# Patient Record
Sex: FEMALE | Race: WHITE | NOT HISPANIC OR LATINO
[De-identification: names, ages, dates, MRNs, and addresses within clinical notes are randomized per-mention and may not be internally consistent; named-entity substitution may affect disease eponyms.]

---

## 2022-06-08 ENCOUNTER — APPOINTMENT (OUTPATIENT)
Dept: ORTHOPEDIC SURGERY | Facility: CLINIC | Age: 87
End: 2022-06-08
Payer: MEDICARE

## 2022-06-08 VITALS — WEIGHT: 160 LBS | HEIGHT: 60 IN | BODY MASS INDEX: 31.41 KG/M2

## 2022-06-08 PROBLEM — Z00.00 ENCOUNTER FOR PREVENTIVE HEALTH EXAMINATION: Status: ACTIVE | Noted: 2022-06-08

## 2022-06-08 PROCEDURE — J3490M: CUSTOM

## 2022-06-08 PROCEDURE — 73562 X-RAY EXAM OF KNEE 3: CPT | Mod: 50

## 2022-06-08 PROCEDURE — 20611 DRAIN/INJ JOINT/BURSA W/US: CPT | Mod: 50

## 2022-06-08 PROCEDURE — 99204 OFFICE O/P NEW MOD 45 MIN: CPT | Mod: 25

## 2022-06-08 NOTE — HISTORY OF PRESENT ILLNESS
[10] : 10 [0] : 0 [Tightness] : tightness [Standing] : standing [Walking] : walking [de-identified] : 6/8/22:  bilateral knee pain. [] : no [FreeTextEntry1] : shanta knees  [FreeTextEntry5] : pt states no injury has occurred

## 2022-06-08 NOTE — PHYSICAL EXAM
[NL (0)] : extension 0 degrees [4___] : quadriceps 4[unfilled]/5 [5___] : hamstring 5[unfilled]/5 [] : antalgic [Left] : left knee [Right] : right knee [Degenerative change] : Degenerative change [TWNoteComboBox7] : flexion 100 degrees

## 2022-06-08 NOTE — PROCEDURE
[Large Joint Injection] : Large joint injection [Bilateral] : bilaterally of the [Knee] : knee [Pain] : pain [Inflammation] : inflammation [Alcohol] : alcohol [Betadine] : betadine [___ cc    3mg] :  Betamethasone (Celestone) ~Vcc of 3mg [___ cc    1%] : Lidocaine ~Vcc of 1%  [___ cc    0.25%] : Bupivacaine (Marcaine) ~Vcc of 0.25%  [] : Patient tolerated procedure well [Call if redness, pain or fever occur] : call if redness, pain or fever occur [Apply ice for 15min out of every hour for the next 12-24 hours as tolerated] : apply ice for 15 minutes out of every hour for the next 12-24 hours as tolerated [Patient was advised to rest the joint(s) for ____ days] : patient was advised to rest the joint(s) for [unfilled] days [Previous OTC use and PT nontherapeutic] : patient has tried OTC's including aspirin, Ibuprofen, Aleve, etc or prescription NSAIDS, and/or exercises at home and/or physical therapy without satisfactory response [Patient had decreased mobility in the joint] : patient had decreased mobility in the joint [Risks, benefits, alternatives discussed / Verbal consent obtained] : the risks benefits, and alternatives have been discussed, and verbal consent was obtained [Altered anatomic landmarks d/t erosive arthritis] : altered anatomic landmarks d/t erosive arthritis [All ultrasound images have been permanently captured and stored accordingly in our picture archiving and communication system] : All ultrasound images have been permanently captured and stored accordingly in our picture archiving and communication system [Visualization of the needle and placement of injection was performed without complication] : visualization of the needle and placement of injection was performed without complication

## 2022-08-24 ENCOUNTER — APPOINTMENT (OUTPATIENT)
Dept: ORTHOPEDIC SURGERY | Facility: CLINIC | Age: 87
End: 2022-08-24

## 2022-08-24 PROCEDURE — J3490M: CUSTOM

## 2022-08-24 PROCEDURE — 20611 DRAIN/INJ JOINT/BURSA W/US: CPT | Mod: LT

## 2022-08-24 PROCEDURE — 99214 OFFICE O/P EST MOD 30 MIN: CPT | Mod: 25

## 2022-08-24 NOTE — PROCEDURE
[Large Joint Injection] : Large joint injection [Left] : of the left [Knee] : knee [Pain] : pain [Alcohol] : alcohol [Betadine] : betadine [___ cc    3mg] :  Betamethasone (Celestone) ~Vcc of 3mg [___ cc    1%] : Lidocaine ~Vcc of 1%  [___ cc    0.25%] : Bupivacaine (Marcaine) ~Vcc of 0.25%  [] : Patient tolerated procedure well [Call if redness, pain or fever occur] : call if redness, pain or fever occur [Apply ice for 15min out of every hour for the next 12-24 hours as tolerated] : apply ice for 15 minutes out of every hour for the next 12-24 hours as tolerated [Patient was advised to rest the joint(s) for ____ days] : patient was advised to rest the joint(s) for [unfilled] days [Previous OTC use and PT nontherapeutic] : patient has tried OTC's including aspirin, Ibuprofen, Aleve, etc or prescription NSAIDS, and/or exercises at home and/or physical therapy without satisfactory response [Patient had decreased mobility in the joint] : patient had decreased mobility in the joint [Risks, benefits, alternatives discussed / Verbal consent obtained] : the risks benefits, and alternatives have been discussed, and verbal consent was obtained [Prior failure or difficult injection] : prior failure or difficult injection [All ultrasound images have been permanently captured and stored accordingly in our picture archiving and communication system] : All ultrasound images have been permanently captured and stored accordingly in our picture archiving and communication system [Visualization of the needle and placement of injection was performed without complication] : visualization of the needle and placement of injection was performed without complication [Inflammation] : inflammation

## 2022-08-24 NOTE — HISTORY OF PRESENT ILLNESS
[10] : 10 [de-identified] : 6/8/22:  bilateral knee pain.\par 8/24/22: some recurrent pain.  left worse than right. [FreeTextEntry1] : bilateral knees  [de-identified] : cortisone injection

## 2022-08-24 NOTE — ASSESSMENT
[FreeTextEntry1] : severe bilatera knee oa. mild relief with injection.  some recurrent bilateral knee pain.  left worse than right.\par \par going on vacation next week.  will try cortisone today.  discussed risks of a little early.  \par \par

## 2022-12-14 ENCOUNTER — APPOINTMENT (OUTPATIENT)
Dept: ORTHOPEDIC SURGERY | Facility: CLINIC | Age: 87
End: 2022-12-14

## 2022-12-14 VITALS — HEIGHT: 60 IN | BODY MASS INDEX: 31.41 KG/M2 | WEIGHT: 160 LBS

## 2022-12-14 PROCEDURE — 20611 DRAIN/INJ JOINT/BURSA W/US: CPT | Mod: 50

## 2022-12-14 PROCEDURE — 99214 OFFICE O/P EST MOD 30 MIN: CPT | Mod: 25

## 2022-12-14 PROCEDURE — J3490M: CUSTOM

## 2022-12-14 NOTE — PROCEDURE
[FreeTextEntry3] : Procedure Name: Large Joint Injection / Aspiration: Celestone, Lidocaine and Marcaine with Ultrasound Evaluation and Guidance\par \par Large Joint Injection was performed because of pain and inflammation. Anesthesia: ethyl chloride sprayed topically.\par Celestone: An injection of Celestone 6 mg , 1 cc.\par Lidocaine 1%: 3 cc.\par Marcaine 0.25%:  3 cc.\par \par Patient has tried OTC's including aspirin, Ibuprofen, Aleve etc or prescription NSAIDS, and/or exercises at home and/ or physical therapy without satisfactory response and Patient has decreased mobility in the joint. The risks, benefits, and alternatives to cortisone injection were explained in full to the patient. Risks outlined include but are not limited to infection, sepsis, bleeding, scarring, skin discoloration, temporary increase in pain, syncopal episode, failure to resolve symptoms, allergic reaction, symptom recurrence, and elevation of blood sugar in diabetics. Patient understood the risks. All questions were answered.   Oral informed consent was obtained.   Sterile technique was utilized for the procedure including the preparation of the solutions used for the injection. Medication was injected into BILATERAL KNEES.   Patient tolerated the procedure well. Advised to ice the injection site this evening.  Post Procedure Instructions: Patient was advised to call if redness, pain, or fever occur and apply ice for 15 min. out of every hour for the next 12-24 hours as tolerated. patient was advised to rest the joint(s) for 2 days. \par \par Ultrasound Extremity (17138) was used because of the following reasons: inflammation.\par Ultrasound Guidance was used for the following reasons: for accurate placement of needle into knee joints.\par Diagnostic ultrasound was performed of the knee and is positive for synovitis.\par Ultrasound guided injection was performed of the knee, visualization of the needle and placement of injection was performed without complication.\par \par \par

## 2022-12-14 NOTE — DISCUSSION/SUMMARY
[de-identified] : Progress note completed by Kaitlin Montesinos PA-C\par *Dr. Marroquin - The [facility] assigned on this date is under my supervision and saw this patient independently.  I have reviewed the note and agree with the treatment provided.

## 2022-12-14 NOTE — HISTORY OF PRESENT ILLNESS
[10] : 10 [5] : 5 [de-identified] : 6/8/22:  bilateral knee pain.\par 8/24/22: some recurrent pain.  left worse than right.\par 12/14/22:  bilateral knee pain.   [FreeTextEntry1] : bilateral knees

## 2023-04-05 ENCOUNTER — APPOINTMENT (OUTPATIENT)
Dept: ORTHOPEDIC SURGERY | Facility: CLINIC | Age: 88
End: 2023-04-05
Payer: MEDICARE

## 2023-04-05 VITALS — BODY MASS INDEX: 31.41 KG/M2 | HEIGHT: 60 IN | WEIGHT: 160 LBS

## 2023-04-05 PROCEDURE — 99214 OFFICE O/P EST MOD 30 MIN: CPT | Mod: 25

## 2023-04-05 PROCEDURE — 20611 DRAIN/INJ JOINT/BURSA W/US: CPT | Mod: 50

## 2023-04-05 NOTE — ASSESSMENT
[FreeTextEntry1] : severe bilatera knee oa. mild relief with injection.  some recurrent bilateral knee pain.  left worse than right.\par \par csi on 12/14/22. \par \par

## 2023-04-05 NOTE — PROCEDURE
[FreeTextEntry3] : Procedure Name: Synvisc (Large Joint) with Ultrasound Guidance  Viscosupplementation Injection: X-ray evidence of Osteoarthritis on this or prior visit and Patient has tried OTC's including aspirin, Ibuprofen, Aleve etc or prescription NSAIDS, and/or exercises at home and/ or physical therapy without satisfactory response.  The risks, benefits, and alternatives to Viscosupplementation injection were explained in full to the patient. Risks outlined include but are not limited to infection, sepsis, bleeding, scarring, skin discoloration, temporary increase in pain, syncopal episode, failure to resolve symptoms, allergic reaction, and symptom recurrence. Signs and symptoms of infection reviewed and patient advised to call immediately for redness, fevers, and/or chills. Patient understood the risks. All questions were answered.   Oral informed consent was obtained.   Sterile technique was utilized for the procedure including the preparation of the solutions used for the injection. An injection of Synvisc 2ml #1 was injected into BILATERAL KNEES.  Patient tolerated the procedure well. Advised to ice the injection site this evening.  Post Procedure Instructions: Patient was advised to call if redness, pain, or fever occur and apply ice for 15 min. out of every hour for the next 12-24 hours as tolerated. patient was advised to rest the joint(s) for 2 days.   Ultrasound Extremity (62762) was used because of the following reasons: inflammation. Ultrasound Guidance was used for the following reasons: for accurate placement of needle into knee joint Diagnostic ultrasound was performed of the knee and is positive for synovitis. Ultrasound guided injection was performed of the knee, visualization of the needle and placement of injection was performed without complication.

## 2023-04-05 NOTE — DISCUSSION/SUMMARY
[de-identified] : Progress Note completed by Kaitlin Montesinos PA-C\par * Dr. Marroquin -- The documentation recorded in this note accurately reflects the decisions made by me during this visit.

## 2023-04-05 NOTE — HISTORY OF PRESENT ILLNESS
[Gradual] : gradual [10] : 10 [Frequent] : frequent [Standing] : standing [Walking] : walking [Synvisc] : Synvisc [de-identified] : 6/8/22:  bilateral knee pain.\par 8/24/22: some recurrent pain.  left worse than right.\par 12/14/22:  bilateral knee pain.  \par 4/5/23:  follow up bilateral knees.  pain persists.  reports to temp relief with bilateral knee csi last visti.  still pain with walking for long periods of time and with stairs.  [] : This patient has had an injection before: no [FreeTextEntry1] : b/l knees

## 2023-04-19 ENCOUNTER — APPOINTMENT (OUTPATIENT)
Dept: ORTHOPEDIC SURGERY | Facility: CLINIC | Age: 88
End: 2023-04-19
Payer: MEDICARE

## 2023-04-19 VITALS — WEIGHT: 160 LBS | BODY MASS INDEX: 31.41 KG/M2 | HEIGHT: 60 IN

## 2023-04-19 PROCEDURE — 20611 DRAIN/INJ JOINT/BURSA W/US: CPT | Mod: 50

## 2023-04-19 PROCEDURE — 99212 OFFICE O/P EST SF 10 MIN: CPT | Mod: 25

## 2023-04-19 NOTE — PROCEDURE
[FreeTextEntry3] : Procedure Name: Synvisc (Large Joint) with Ultrasound Guidance\par \par   Viscosupplementation Injection: X-ray evidence of Osteoarthritis on this or prior visit and Patient has tried OTC's including aspirin, Ibuprofen, Aleve etc or prescription NSAIDS, and/or exercises at home and/ or physical therapy without satisfactory response.  The risks, benefits, and alternatives to Viscosupplementation injection were explained in full to the patient. Risks outlined include but are not limited to infection, sepsis, bleeding, scarring, skin discoloration, temporary increase in pain, syncopal episode, failure to resolve symptoms, allergic reaction, and symptom recurrence. Signs and symptoms of infection reviewed and patient advised to call immediately for redness, fevers, and/or chills. Patient understood the risks. All questions were answered.   Oral informed consent was obtained.   Sterile technique was utilized for the procedure including the preparation of the solutions used for the injection. An injection of Synvisc 2ml #2 was injected into BILATERAL KNEES.  Patient tolerated the procedure well. Advised to ice the injection site this evening.  Post Procedure Instructions: Patient was advised to call if redness, pain, or fever occur and apply ice for 15 min. out of every hour for the next 12-24 hours as tolerated. patient was advised to rest the joint(s) for 2 days.   Ultrasound Extremity (86660) was used because of the following reasons: inflammation. Ultrasound Guidance was used for the following reasons: for accurate placement of needle into knee joint Diagnostic ultrasound was performed of the knee and is positive for synovitis. Ultrasound guided injection was performed of the knee, visualization of the needle and placement of injection was performed without complication.

## 2023-04-19 NOTE — ASSESSMENT
[FreeTextEntry1] : severe bilateral knee oa. mild relief with injection.  some recurrent bilateral knee pain.  left worse than right.\par \par csi on 12/14/22. \par \par

## 2023-04-19 NOTE — DISCUSSION/SUMMARY
[de-identified] : Progress Note completed by Kaitlin Montesinos PA-C\par * Dr. Marroquin -- The documentation recorded in this note accurately reflects the decisions made by me during this visit.

## 2023-04-19 NOTE — HISTORY OF PRESENT ILLNESS
[Gradual] : gradual [10] : 10 [Dull/Aching] : dull/aching [Sharp] : sharp [Occasional] : occasional [Rest] : rest [Injection therapy] : injection therapy [Walking] : walking [2] : 2 [Synvisc] : Synvisc [de-identified] : 6/8/22:  bilateral knee pain.\par 8/24/22: some recurrent pain.  left worse than right.\par 12/14/22:  bilateral knee pain.  \par 4/5/23:  follow up bilateral knees.  pain persists.  reports to temp relief with bilateral knee csi last visti.  still pain with walking for long periods of time and with stairs. \par 4/19/23:  bilateral knee pain continues.  no adverse reactions from first set.  [FreeTextEntry1] : b/l knees [de-identified] : b/l knees [de-identified] : synvisc

## 2023-04-26 ENCOUNTER — APPOINTMENT (OUTPATIENT)
Dept: ORTHOPEDIC SURGERY | Facility: CLINIC | Age: 88
End: 2023-04-26
Payer: MEDICARE

## 2023-04-26 VITALS — BODY MASS INDEX: 31.41 KG/M2 | WEIGHT: 160 LBS | HEIGHT: 60 IN

## 2023-04-26 DIAGNOSIS — M17.11 UNILATERAL PRIMARY OSTEOARTHRITIS, RIGHT KNEE: ICD-10-CM

## 2023-04-26 DIAGNOSIS — M17.12 UNILATERAL PRIMARY OSTEOARTHRITIS, LEFT KNEE: ICD-10-CM

## 2023-04-26 PROCEDURE — 99213 OFFICE O/P EST LOW 20 MIN: CPT | Mod: 25

## 2023-04-26 PROCEDURE — 20611 DRAIN/INJ JOINT/BURSA W/US: CPT | Mod: 50

## 2023-04-26 NOTE — HISTORY OF PRESENT ILLNESS
[5] : 5 [Constant] : constant [Injection therapy] : injection therapy [Retired] : Work status: retired [3] : 3 [Synvisc] : Synvisc [de-identified] : 6/8/22:  bilateral knee pain.\par 8/24/22: some recurrent pain.  left worse than right.\par 12/14/22:  bilateral knee pain.  \par 4/5/23:  follow up bilateral knees.  pain persists.  reports to temp relief with bilateral knee csi last visti.  still pain with walking for long periods of time and with stairs. \par 4/19/23:  bilateral knee pain continues.  no adverse reactions from first set. \par 4/26/23:  bilateral knee pain persists.  [FreeTextEntry1] : b/l knees [de-identified] : 04/19 [de-identified] : b/l knees

## 2023-04-26 NOTE — ASSESSMENT
"MedMined Medication Reconciliation  Template    Patient was admitted on 2/4/2017 for Acute liver failure without hepatic coma.      Patient's prior to admission medication regimen was as follows:  Facility-Administered Medications Prior to Admission   Medication Dose Route Frequency Provider Last Rate Last Dose    triptorelin pamoate Syrg 22.5 mg  22.5 mg Intramuscular 1 time in Clinic/HOD Sachin Finley Jr., MD         Prescriptions Prior to Admission   Medication Sig Dispense Refill Last Dose    allopurinol (ZYLOPRIM) 300 MG tablet Take 1 tablet (300 mg total) by mouth Daily. 90 tablet 3 Taking    aspirin 81 mg Tab Take 1 tablet by mouth once daily.    Taking    bicalutamide (CASODEX) 50 MG Tab Take 1 tablet (50 mg total) by mouth once daily. 30 tablet 11 Taking    carvedilol (COREG) 12.5 MG tablet Take 1 tablet (12.5 mg total) by mouth 2 (two) times daily with meals. 180 tablet 3 Taking    donepezil (ARICEPT) 5 MG tablet Take 1 tablet (5 mg total) by mouth once daily. 90 tablet 3 Taking    doxazosin (CARDURA) 2 MG tablet Take 1 tablet (2 mg total) by mouth every evening. 90 tablet 3 Taking    furosemide (LASIX) 40 MG tablet Take 1 tablet (40 mg total) by mouth 2 (two) times daily. 180 tablet 3 Taking    hydrALAZINE (APRESOLINE) 50 MG tablet Take 1 tablet (50 mg total) by mouth every 12 (twelve) hours. 180 tablet 3 Taking    simvastatin (ZOCOR) 40 MG tablet Take 1 tablet (40 mg total) by mouth every evening. 90 tablet 3 Taking         Please add appropriate    SmartPhrase below:      Admission Medication Reconciliation - Pharmacy Consult Note    The home medication history was taken by Dolores Shannon pharm tech.  Based on information gathered and subsequent review by the clinical pharmacist, the items below may need attention.    You may go to "Admission" then "Reconcile Home Medications" tabs to review and/or act upon these items.        No issues noted with the medication " [FreeTextEntry1] : severe bilateral knee oa. mild relief with injection.  some recurrent bilateral knee pain.  left worse than right.\par \par csi on 12/14/22. \par \par  reconciliation.        Please address this information as you see fit.  Feel free to contact us if you have any questions or require assistance.    Pato Lua, PharmD  [id number]

## 2023-04-26 NOTE — DISCUSSION/SUMMARY
[de-identified] : Progress Note completed by Kaitlin Montesinos PA-C\par * Dr. Marroquin -- The documentation recorded in this note accurately reflects the decisions made by me during this visit.

## 2023-04-26 NOTE — PROCEDURE
[FreeTextEntry3] : Procedure Name: Synvisc (Large Joint) with Ultrasound Guidance\par \par   Viscosupplementation Injection: X-ray evidence of Osteoarthritis on this or prior visit and Patient has tried OTC's including aspirin, Ibuprofen, Aleve etc or prescription NSAIDS, and/or exercises at home and/ or physical therapy without satisfactory response.  The risks, benefits, and alternatives to Viscosupplementation injection were explained in full to the patient. Risks outlined include but are not limited to infection, sepsis, bleeding, scarring, skin discoloration, temporary increase in pain, syncopal episode, failure to resolve symptoms, allergic reaction, and symptom recurrence. Signs and symptoms of infection reviewed and patient advised to call immediately for redness, fevers, and/or chills. Patient understood the risks. All questions were answered.   Oral informed consent was obtained.   Sterile technique was utilized for the procedure including the preparation of the solutions used for the injection. An injection of Synvisc 2ml #3 was injected into BILATERAL KNEES.  Patient tolerated the procedure well. Advised to ice the injection site this evening.  Post Procedure Instructions: Patient was advised to call if redness, pain, or fever occur and apply ice for 15 min. out of every hour for the next 12-24 hours as tolerated. patient was advised to rest the joint(s) for 2 days.   Ultrasound Extremity (97785) was used because of the following reasons: inflammation. Ultrasound Guidance was used for the following reasons: for accurate placement of needle into knee joint Diagnostic ultrasound was performed of the knee and is positive for synovitis. Ultrasound guided injection was performed of the knee, visualization of the needle and placement of injection was performed without complication.

## 2023-06-07 ENCOUNTER — APPOINTMENT (OUTPATIENT)
Dept: ORTHOPEDIC SURGERY | Facility: CLINIC | Age: 88
End: 2023-06-07